# Patient Record
Sex: MALE | Race: BLACK OR AFRICAN AMERICAN | NOT HISPANIC OR LATINO | URBAN - METROPOLITAN AREA
[De-identification: names, ages, dates, MRNs, and addresses within clinical notes are randomized per-mention and may not be internally consistent; named-entity substitution may affect disease eponyms.]

---

## 2024-05-06 ENCOUNTER — TELEHEALTH PROVIDED OTHER THAN IN PATIENT'S HOME (OUTPATIENT)
Dept: URBAN - METROPOLITAN AREA TELEHEALTH 7 | Facility: TELEHEALTH | Age: 39
End: 2024-05-06

## 2024-05-06 VITALS — WEIGHT: 175 LBS | HEIGHT: 70 IN

## 2024-05-06 DIAGNOSIS — B18.1 CHRONIC VIRAL HEPATITIS B WITHOUT DELTA-AGENT: ICD-10-CM

## 2024-05-06 PROCEDURE — 99204 OFFICE O/P NEW MOD 45 MIN: CPT | Mod: 95 | Performed by: NURSE PRACTITIONER

## 2024-05-06 NOTE — SERVICEHPINOTES
PATIENT VERIFIED BY DATE OF BIRTH AND NAME. Patient has been consented for this telecommunication visit using [employer] application. Moved to [place] and wants to establish care here. Hx of chronic hep B, diagnosed in 2020. 
br Last blood work was done about a few weeks ago by PCP.  The viral load was decreased per pt. Last u/s was done last year. 
br Never been treated. Previous specialist did not recommend the treatment since has low viral load.
br Denies nausea, vomiting, abdominal pain, acid reflux or weight loss. 
br BMs are daily. Denies blood in stool or melena. 
br Denies family hx of chronic hep B. br
Denies personal hx of CVD. 
br Drinks rarely or no smoking. Denies hx of drug abuse. br
br

## 2024-05-06 NOTE — SERVICENOTES
Patient was located at their place of work - at home during visit., Patient's visit was conducted through [employer] video telecommunication. Patient consented before the start of visit as to understanding of privacy concerns, possible technological failure, and their responsibility of carrying out instructions of plan., I spent 15 minutes today with the patient for this telehealth visit.